# Patient Record
Sex: MALE | Race: BLACK OR AFRICAN AMERICAN | Employment: FULL TIME
[De-identification: names, ages, dates, MRNs, and addresses within clinical notes are randomized per-mention and may not be internally consistent; named-entity substitution may affect disease eponyms.]

---

## 2023-03-15 ENCOUNTER — HOSPITAL ENCOUNTER (EMERGENCY)
Facility: HOSPITAL | Age: 43
Discharge: HOME | End: 2023-03-15
Attending: EMERGENCY MEDICINE
Payer: COMMERCIAL

## 2023-03-15 ENCOUNTER — APPOINTMENT (OUTPATIENT)
Facility: HOSPITAL | Age: 43
End: 2023-03-15
Payer: COMMERCIAL

## 2023-03-15 VITALS
HEART RATE: 79 BPM | TEMPERATURE: 98 F | OXYGEN SATURATION: 97 % | WEIGHT: 230 LBS | SYSTOLIC BLOOD PRESSURE: 179 MMHG | BODY MASS INDEX: 32.93 KG/M2 | RESPIRATION RATE: 14 BRPM | HEIGHT: 70 IN | DIASTOLIC BLOOD PRESSURE: 101 MMHG

## 2023-03-15 DIAGNOSIS — M25.521 RIGHT ELBOW PAIN: Primary | ICD-10-CM

## 2023-03-15 DIAGNOSIS — R03.0 ELEVATED BLOOD PRESSURE READING: ICD-10-CM
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
## 2023-03-15 PROCEDURE — 99283 EMERGENCY DEPT VISIT LOW MDM: CPT

## 2023-03-15 PROCEDURE — 73080 X-RAY EXAM OF ELBOW: CPT

## 2023-03-15 PROCEDURE — 6370000000 HC RX 637 (ALT 250 FOR IP): Performed by: PHYSICIAN ASSISTANT

## 2023-03-15 RX ORDER — HYDROCODONE BITARTRATE AND ACETAMINOPHEN 5; 325 MG/1; MG/1
1 TABLET ORAL
Status: COMPLETED | OUTPATIENT
Start: 2023-03-15 | End: 2023-03-15

## 2023-03-15 RX ORDER — OXYCODONE HYDROCHLORIDE AND ACETAMINOPHEN 5; 325 MG/1; MG/1
1 TABLET ORAL EVERY 6 HOURS PRN
Qty: 12 TABLET | Refills: 0 | Status: SHIPPED | OUTPATIENT
Start: 2023-03-15 | End: 2023-03-18

## 2023-03-15 RX ORDER — IBUPROFEN 400 MG/1
400 TABLET ORAL
Status: COMPLETED | OUTPATIENT
Start: 2023-03-15 | End: 2023-03-15

## 2023-03-15 RX ADMIN — IBUPROFEN 400 MG: 400 TABLET, FILM COATED ORAL at 12:37

## 2023-03-15 RX ADMIN — HYDROCODONE BITARTRATE AND ACETAMINOPHEN 1 TABLET: 5; 325 TABLET ORAL at 12:37

## 2023-03-15 ASSESSMENT — PAIN SCALES - GENERAL: PAINLEVEL_OUTOF10: 6

## 2023-03-15 NOTE — ED PROVIDER NOTES
[facility]  EMERGENCY DEPARTMENT ENCOUNTER       Pt Name: Sherlyn Kulkarni  MRN: [id number]  [place] 1980  Date of evaluation: 3/15/2023  Provider: Leni Cranker, PA   PCP: None Provider  Note Started:  3/15/23     CHIEF COMPLAINT       Chief Complaint   Patient presents with    Arm Pain        HISTORY OF PRESENT ILLNESS: 1 or more elements      History From: Patient  HPI Limitations: None     Sherlyn Kulkarni is a 36 y.o. male who presents to the emergency department with a chief complaint of right elbow pain onset yesterday status post ground-level fall. Patient reports that he tripped while walking and fell backwards landing on his outstretched right arm. He felt pain immediately but was able to take Tylenol and ibuprofen yesterday but then had significantly worse pain when he woke up this morning and decreased range of motion. He denies prior injury to the area. He denies numbness, tingling, head injury. Nursing Notes were all reviewed and agreed with or any disagreements were addressed in the HPI. PAST HISTORY     Past Medical History:  No past medical history on file. Past Surgical History:  No past surgical history on file. Family History:  No family history on file. Social History:  Social History     Socioeconomic History    Marital status:        Allergies:  No Known Allergies    CURRENT MEDICATIONS      No current facility-administered medications for this encounter. Current Outpatient Medications   Medication Sig Dispense Refill    oxyCODONE-acetaminophen (PERCOCET) 5-325 MG per tablet Take 1 tablet by mouth every 6 hours as needed for Pain for up to 3 days. Intended supply: 3 days.  Take lowest dose possible to manage pain Max Daily Amount: 4 tablets 12 tablet 0          PHYSICAL EXAM      Vitals:    03/15/23 0930   BP: (!) 179/101   Pulse: 79   Resp: 14   Temp: 98 °F (36.7 °C)   TempSrc: Temporal   SpO2: 97%   Weight: 230 lb (104.3 kg)   Height: 5' 10\" (1.778 m)     Physical Exam  Vitals and nursing note reviewed. Constitutional:       General: He is not in acute distress. Appearance: Normal appearance. He is not ill-appearing. HENT:      Head: Normocephalic and atraumatic. Nose: Nose normal.      Mouth/Throat:      Mouth: Mucous membranes are moist.      Pharynx: Oropharynx is clear. Eyes:      Extraocular Movements: Extraocular movements intact. Pupils: Pupils are equal, round, and reactive to light. Cardiovascular:      Rate and Rhythm: Normal rate. Pulses: Normal pulses. Pulmonary:      Effort: Pulmonary effort is normal. No respiratory distress. Musculoskeletal:      Right shoulder: Normal.      Right upper arm: Normal.      Right elbow: Swelling present. No deformity or lacerations. Decreased range of motion. Tenderness present in radial head. No olecranon process tenderness. Left elbow: Normal.      Right forearm: Normal.      Right wrist: Normal.      Cervical back: Normal range of motion and neck supple. Comments: Pain with supination and elbow flexion   Skin:     General: Skin is warm and dry. Capillary Refill: Capillary refill takes less than 2 seconds. Neurological:      General: No focal deficit present. Mental Status: He is alert. Psychiatric:         Mood and Affect: Mood normal.          DIAGNOSTIC RESULTS   LABS:     No results found for this or any previous visit (from the past 24 hour(s)). EKG: When ordered, EKG's are interpreted by the Emergency Department Provider in the absence of a cardiologist.  Please see their note for interpretation of EKG. Read by me.       RADIOLOGY:  Non-plain film images such as CT, Ultrasound and MRI are read by the radiologist. Marie Ayala radiographic images are visualized and preliminarily interpreted by the ED Provider with the below findings:     Right elbow XR with possible evidence of radial head subluxation    Read by me, pending review by radiologist.     Interpretation per the Radiologist below, if available at the time of this note:  XR ELBOW RIGHT (MIN 3 VIEWS)   Final Result   No acute abnormality. PROCEDURES   Unless otherwise noted below, none  Procedures         CRITICAL CARE TIME       EMERGENCY DEPARTMENT COURSE and DIFFERENTIAL DIAGNOSIS/MDM   Vitals:    Vitals:    03/15/23 0930   BP: (!) 179/101   Pulse: 79   Resp: 14   Temp: 98 °F (36.7 °C)   TempSrc: Temporal   SpO2: 97%   Weight: 230 lb (104.3 kg)   Height: 5' 10\" (1.778 m)       Patient was given the following medications:  Medications   ibuprofen (ADVIL;MOTRIN) tablet 400 mg (400 mg Oral Given 3/15/23 1237)   HYDROcodone-acetaminophen (NORCO) 5-325 MG per tablet 1 tablet (1 tablet Oral Given 3/15/23 123)         CONSULTS: (Who and What was discussed)  IP CONSULT TO ORTHOPEDIC SURGERY    Chronic Conditions: obesity    Social Determinants affecting Dx or Tx: lack of transportation       Records Reviewed (source and summary): Old medical records. Nursing notes. ED COURSE  ED Course as of 03/16/23 0003   Wed Mar 15, 2023   1208 12:08 PM  Discussed case with ANEL Escamilla with Dr. Cristela Mcnamara (on-call ortho) who agrees with plan for long-arm posterior splint of the right upper extremity, pain control, and then he can follow-up in the office. States that he can be seen tomorrow in [place] or another day in [place]. This was reiterated to the patient and he was given information for making the appointment. [EC]   1225 12:25 PM  ED Splint Check:  Right posterior long arm Splint was placed by ED Tech. Proper placement ensured and neurovascularly intact. [EC]      ED Course User Index  [EC] ANEL Collins       Medial Decision Makin y/o M presents with right elbow pain and swelling after fall on outstretched right arm. Normal exam of right shoulder and right wrist. There is significant swelling of the right elbow.     DDX:  Radial head fracture, radial head subluxation, elbow dislocation, proximal ulnar fracture    XR read by radiologist as NAP how I feel there may be some radial head subluxation although this could be from positioning as patient is unable to flex or supinate the forearm. Other considerations are radial head fracture not appeared on imaging. Will place patient in right long arm splint, unable to provide sling due to patient's inability to flex the forearm. Discussed with ortho on-call who agrees with d/c plan. Will prescribe Percocet for pain control and advised RICE with instructions given for follow up. He voices understanding. Given light duty work note. FINAL IMPRESSION     1. Right elbow pain    2. Fall from ground level    3. Elevated blood pressure reading            DISPOSITION/PLAN   DISPOSITION Decision To Discharge 03/15/2023 11:58:29 AM      Discharged       PATIENT REFERRED TO:  Rupinder Fishman MD  [address] [facility]  [phone]    Call   call office stating that you were seen in the ER and that you need a follow up appointment    [facility] EMERGENCY DEPT  [address] [place] [address] [phone]    As needed, If symptoms worsen       DISCHARGE MEDICATIONS:     Medication List        START taking these medications      oxyCODONE-acetaminophen 5-325 MG per tablet  Commonly known as: Percocet  Take 1 tablet by mouth every 6 hours as needed for Pain for up to 3 days. Intended supply: 3 days. Take lowest dose possible to manage pain Max Daily Amount: 4 tablets               Where to Get Your Medications        These medications were sent to [address] [place] [address], [address] [place] [address]      Phone: [phone]   oxyCODONE-acetaminophen 5-325 MG per tablet            I am the Primary Clinician of Record.        (Please note that parts of this dictation were completed with voice recognition software. Quite often unanticipated grammatical, syntax, homophones, and other interpretive errors are inadvertently transcribed by the computer software. Please disregards these errors.  Please excuse any errors that have escaped final proofreading.)     Steven Jeffries, [address]  03/16/23 8501

## 2023-03-15 NOTE — DISCHARGE INSTRUCTIONS
Elevate, ice, avoid getting your splint wet. Please call the number provided as we have spoken to this orthopedic specialty and you can be seen for follow-up in office.

## 2023-03-15 NOTE — ED TRIAGE NOTES
Pt presents for RUE pain s/p mechanical fall onto tile floor with outstretched hand yesterday.  Denies hitting head

## 2023-03-15 NOTE — Clinical Note
Wisam Rocha was seen and treated in our emergency department on 3/15/2023. He may return to work on 03/16/2023. With restrictions: please allow light duty with no use of right arm until seen by orthopedic specialist     If you have any questions or concerns, please don't hesitate to call.       ANEL Vance